# Patient Record
Sex: FEMALE | Race: BLACK OR AFRICAN AMERICAN | URBAN - METROPOLITAN AREA
[De-identification: names, ages, dates, MRNs, and addresses within clinical notes are randomized per-mention and may not be internally consistent; named-entity substitution may affect disease eponyms.]

---

## 2022-02-21 ENCOUNTER — WEB ENCOUNTER (OUTPATIENT)
Dept: URBAN - METROPOLITAN AREA CLINIC 13 | Facility: CLINIC | Age: 47
End: 2022-02-21

## 2022-03-02 ENCOUNTER — OFFICE VISIT (OUTPATIENT)
Dept: URBAN - METROPOLITAN AREA CLINIC 105 | Facility: CLINIC | Age: 47
End: 2022-03-02
Payer: COMMERCIAL

## 2022-03-02 ENCOUNTER — WEB ENCOUNTER (OUTPATIENT)
Dept: URBAN - METROPOLITAN AREA CLINIC 105 | Facility: CLINIC | Age: 47
End: 2022-03-02

## 2022-03-02 ENCOUNTER — LAB OUTSIDE AN ENCOUNTER (OUTPATIENT)
Dept: URBAN - METROPOLITAN AREA CLINIC 105 | Facility: CLINIC | Age: 47
End: 2022-03-02

## 2022-03-02 VITALS
DIASTOLIC BLOOD PRESSURE: 88 MMHG | BODY MASS INDEX: 42.34 KG/M2 | HEART RATE: 113 BPM | SYSTOLIC BLOOD PRESSURE: 135 MMHG | TEMPERATURE: 97.2 F | HEIGHT: 64 IN | WEIGHT: 248 LBS

## 2022-03-02 DIAGNOSIS — D50.9 IRON DEFICIENCY ANEMIA, UNSPECIFIED IRON DEFICIENCY ANEMIA TYPE: ICD-10-CM

## 2022-03-02 PROCEDURE — 99204 OFFICE O/P NEW MOD 45 MIN: CPT | Performed by: INTERNAL MEDICINE

## 2022-03-02 RX ORDER — KRILL/OM-3/DHA/EPA/PHOSPHO/AST 1000-230MG
1 TABLET CAPSULE ORAL ONCE A DAY
Status: ACTIVE | COMMUNITY

## 2022-03-02 RX ORDER — METFORMIN HYDROCHLORIDE 500 MG/1
1 TABLET WITH A MEAL TABLET, FILM COATED ORAL ONCE A DAY
Status: ACTIVE | COMMUNITY

## 2022-03-02 RX ORDER — LISINOPRIL 10 MG/1
1 TABLET TABLET ORAL ONCE A DAY
Status: ACTIVE | COMMUNITY

## 2022-03-02 RX ORDER — LOSARTAN POTASSIUM 25 MG/1
1 TABLET TABLET ORAL ONCE A DAY
Status: ACTIVE | COMMUNITY

## 2022-03-02 RX ORDER — SEMAGLUTIDE 1.34 MG/ML
AS DIRECTED INJECTION, SOLUTION SUBCUTANEOUS
Status: ACTIVE | COMMUNITY

## 2022-03-02 RX ORDER — SODIUM, POTASSIUM,MAG SULFATES 17.5-3.13G
AS DIRECTED SOLUTION, RECONSTITUTED, ORAL ORAL
Status: ACTIVE | COMMUNITY

## 2022-03-02 RX ORDER — ATORVASTATIN CALCIUM 20 MG/1
1 TABLET TABLET, FILM COATED ORAL ONCE A DAY
Status: ACTIVE | COMMUNITY

## 2022-03-02 RX ORDER — LEVOTHYROXINE SODIUM 50 UG/1
1 TABLET IN THE MORNING ON AN EMPTY STOMACH TABLET ORAL ONCE A DAY
Status: ACTIVE | COMMUNITY

## 2022-03-02 NOTE — HPI-TODAY'S VISIT:
The patient presents for iron deficiency anemia.  Today, the patient says her prior colon was about 20 years ago. She denies taking ibuprofen or Advil, having melena, having rectal bleeding, or experiencing heartburn/regurgitation. She says she took iron 3 weeks ago for only 2 days because she felt a side-effect of constipation. No other reported complaints today. She says she previously had a direct access colon scheduled with a different physician following her lab result.  Labs 12/30/21 - A1c 7. CMP, TSH/FT4 all normal.

## 2022-04-01 ENCOUNTER — WEB ENCOUNTER (OUTPATIENT)
Dept: URBAN - METROPOLITAN AREA SURGERY CENTER 16 | Facility: SURGERY CENTER | Age: 47
End: 2022-04-01

## 2022-04-07 ENCOUNTER — OFFICE VISIT (OUTPATIENT)
Dept: URBAN - METROPOLITAN AREA SURGERY CENTER 16 | Facility: SURGERY CENTER | Age: 47
End: 2022-04-07
Payer: COMMERCIAL

## 2022-04-07 ENCOUNTER — TELEPHONE ENCOUNTER (OUTPATIENT)
Dept: URBAN - METROPOLITAN AREA CLINIC 92 | Facility: CLINIC | Age: 47
End: 2022-04-07

## 2022-04-07 DIAGNOSIS — K29.80 ACUTE DUODENITIS: ICD-10-CM

## 2022-04-07 DIAGNOSIS — K29.60 ADENOPAPILLOMATOSIS GASTRICA: ICD-10-CM

## 2022-04-07 DIAGNOSIS — D50.9 ANEMIA: ICD-10-CM

## 2022-04-07 DIAGNOSIS — K22.70 BARRETT ESOPHAGUS: ICD-10-CM

## 2022-04-07 PROCEDURE — 43239 EGD BIOPSY SINGLE/MULTIPLE: CPT | Performed by: INTERNAL MEDICINE

## 2022-04-07 PROCEDURE — 45378 DIAGNOSTIC COLONOSCOPY: CPT | Performed by: INTERNAL MEDICINE

## 2022-04-07 PROCEDURE — G8907 PT DOC NO EVENTS ON DISCHARG: HCPCS | Performed by: INTERNAL MEDICINE

## 2022-04-07 RX ORDER — ATORVASTATIN CALCIUM 20 MG/1
1 TABLET TABLET, FILM COATED ORAL ONCE A DAY
Status: ACTIVE | COMMUNITY

## 2022-04-07 RX ORDER — KRILL/OM-3/DHA/EPA/PHOSPHO/AST 1000-230MG
1 TABLET CAPSULE ORAL ONCE A DAY
Status: ACTIVE | COMMUNITY

## 2022-04-07 RX ORDER — SEMAGLUTIDE 1.34 MG/ML
AS DIRECTED INJECTION, SOLUTION SUBCUTANEOUS
Status: ACTIVE | COMMUNITY

## 2022-04-07 RX ORDER — LEVOTHYROXINE SODIUM 50 UG/1
1 TABLET IN THE MORNING ON AN EMPTY STOMACH TABLET ORAL ONCE A DAY
Status: ACTIVE | COMMUNITY

## 2022-04-07 RX ORDER — METFORMIN HYDROCHLORIDE 500 MG/1
1 TABLET WITH A MEAL TABLET, FILM COATED ORAL ONCE A DAY
Status: ACTIVE | COMMUNITY

## 2022-04-07 RX ORDER — OMEPRAZOLE 40 MG/1
1 CAPSULE 30 MINUTES BEFORE MORNING MEAL CAPSULE, DELAYED RELEASE ORAL ONCE A DAY
Qty: 30 | Refills: 2 | OUTPATIENT
Start: 2022-04-07

## 2022-04-07 RX ORDER — LISINOPRIL 10 MG/1
1 TABLET TABLET ORAL ONCE A DAY
Status: ACTIVE | COMMUNITY

## 2022-04-07 RX ORDER — SODIUM, POTASSIUM,MAG SULFATES 17.5-3.13G
AS DIRECTED SOLUTION, RECONSTITUTED, ORAL ORAL
Status: ACTIVE | COMMUNITY

## 2022-04-07 RX ORDER — LOSARTAN POTASSIUM 25 MG/1
1 TABLET TABLET ORAL ONCE A DAY
Status: ACTIVE | COMMUNITY

## 2022-05-02 ENCOUNTER — TELEPHONE ENCOUNTER (OUTPATIENT)
Dept: URBAN - METROPOLITAN AREA CLINIC 92 | Facility: CLINIC | Age: 47
End: 2022-05-02

## 2022-05-02 RX ORDER — OMEPRAZOLE 40 MG/1
1 CAPSULE 30 MINUTES BEFORE MORNING MEAL CAPSULE, DELAYED RELEASE ORAL ONCE A DAY
Qty: 30 | Refills: 2 | OUTPATIENT
Start: 2022-04-07

## 2022-05-06 ENCOUNTER — OFFICE VISIT (OUTPATIENT)
Dept: URBAN - METROPOLITAN AREA TELEHEALTH 2 | Facility: TELEHEALTH | Age: 47
End: 2022-05-06
Payer: COMMERCIAL

## 2022-05-06 DIAGNOSIS — Z12.11 SCREEN FOR COLON CANCER: ICD-10-CM

## 2022-05-06 DIAGNOSIS — D50.9 IRON DEFICIENCY ANEMIA, UNSPECIFIED IRON DEFICIENCY ANEMIA TYPE: ICD-10-CM

## 2022-05-06 DIAGNOSIS — K25.3 ACUTE EROSION OF PYLORIC ANTRUM: ICD-10-CM

## 2022-05-06 DIAGNOSIS — K22.70 BARRETT'S ESOPHAGUS WITHOUT DYSPLASIA: ICD-10-CM

## 2022-05-06 PROCEDURE — 99213 OFFICE O/P EST LOW 20 MIN: CPT | Performed by: INTERNAL MEDICINE

## 2022-05-06 RX ORDER — OMEPRAZOLE 20 MG/1
1 CAPSULE 30 MINUTES BEFORE MORNING MEAL CAPSULE, DELAYED RELEASE ORAL ONCE A DAY
Qty: 90 | Refills: 1 | OUTPATIENT
Start: 2022-04-07

## 2022-05-06 RX ORDER — OMEPRAZOLE 40 MG/1
1 CAPSULE 30 MINUTES BEFORE MORNING MEAL CAPSULE, DELAYED RELEASE ORAL ONCE A DAY
Qty: 30 | Refills: 2 | Status: ACTIVE | COMMUNITY
Start: 2022-04-07

## 2022-05-06 RX ORDER — KRILL/OM-3/DHA/EPA/PHOSPHO/AST 1000-230MG
1 TABLET CAPSULE ORAL ONCE A DAY
Status: ACTIVE | COMMUNITY

## 2022-05-06 RX ORDER — ATORVASTATIN CALCIUM 20 MG/1
1 TABLET TABLET, FILM COATED ORAL ONCE A DAY
Status: ACTIVE | COMMUNITY

## 2022-05-06 RX ORDER — LEVOTHYROXINE SODIUM 50 UG/1
1 TABLET IN THE MORNING ON AN EMPTY STOMACH TABLET ORAL ONCE A DAY
Status: ACTIVE | COMMUNITY

## 2022-05-06 RX ORDER — SEMAGLUTIDE 1.34 MG/ML
AS DIRECTED INJECTION, SOLUTION SUBCUTANEOUS
Status: ACTIVE | COMMUNITY

## 2022-05-06 RX ORDER — LOSARTAN POTASSIUM 25 MG/1
1 TABLET TABLET ORAL ONCE A DAY
Status: ACTIVE | COMMUNITY

## 2022-05-06 NOTE — HPI-TODAY'S VISIT:
The patient presents on follow-up for iron deficiency anemia.  On 3/2/22, the patient said her prior colon was about 20 years ago. She denied taking ibuprofen or Advil, having melena, having rectal bleeding, or experiencing heartburn/regurgitation. She said she took iron 3 weeks ago for only 2 days because she felt a side-effect of constipation. No other reported complaints today. She said she previously had a direct access colon scheduled with a different physician following her lab result.  EGD/Colonoscopy performed - results below.  Today, she says she has started on omeprazole 40 mg QD - asymptomatic today. Regurgitation was her primary reflux symptom, but that has resolved since starting on omeprazole. She previously had an ongoing cough that Dr. Wise attributed to lisinopril use - switched to losartan.   She says she was recently diagnosed with breast cancer - will have a lumpectomy and reconstructive surgery in a week then radiation for a few weeks.  Labs 2/3/22 - CBC normal except MCV 78.8, platelets 474. HDL 44. TSH normal. 12/30/21 - A1c 7. CMP, TSH/FT4 all normal.

## 2022-08-05 ENCOUNTER — OFFICE VISIT (OUTPATIENT)
Dept: URBAN - METROPOLITAN AREA TELEHEALTH 2 | Facility: TELEHEALTH | Age: 47
End: 2022-08-05
Payer: COMMERCIAL

## 2022-08-05 DIAGNOSIS — Z12.11 SCREEN FOR COLON CANCER: ICD-10-CM

## 2022-08-05 DIAGNOSIS — D50.9 IRON DEFICIENCY ANEMIA, UNSPECIFIED IRON DEFICIENCY ANEMIA TYPE: ICD-10-CM

## 2022-08-05 DIAGNOSIS — K25.3 ACUTE EROSION OF PYLORIC ANTRUM: ICD-10-CM

## 2022-08-05 DIAGNOSIS — K22.70 BARRETT'S ESOPHAGUS WITHOUT DYSPLASIA: ICD-10-CM

## 2022-08-05 PROCEDURE — 99213 OFFICE O/P EST LOW 20 MIN: CPT | Performed by: INTERNAL MEDICINE

## 2022-08-05 RX ORDER — SEMAGLUTIDE 1.34 MG/ML
AS DIRECTED INJECTION, SOLUTION SUBCUTANEOUS
Status: ACTIVE | COMMUNITY

## 2022-08-05 RX ORDER — LOSARTAN POTASSIUM 25 MG/1
1 TABLET TABLET ORAL ONCE A DAY
Status: ACTIVE | COMMUNITY

## 2022-08-05 RX ORDER — ATORVASTATIN CALCIUM 20 MG/1
1 TABLET TABLET, FILM COATED ORAL ONCE A DAY
Status: ACTIVE | COMMUNITY

## 2022-08-05 RX ORDER — LEVOTHYROXINE SODIUM 50 UG/1
1 TABLET IN THE MORNING ON AN EMPTY STOMACH TABLET ORAL ONCE A DAY
Status: ACTIVE | COMMUNITY

## 2022-08-05 RX ORDER — KRILL/OM-3/DHA/EPA/PHOSPHO/AST 1000-230MG
1 TABLET CAPSULE ORAL ONCE A DAY
Status: ACTIVE | COMMUNITY

## 2022-08-05 RX ORDER — OMEPRAZOLE 20 MG/1
1 CAPSULE 30 MINUTES BEFORE MORNING MEAL CAPSULE, DELAYED RELEASE ORAL ONCE A DAY
Qty: 90 | Refills: 1 | Status: ACTIVE | COMMUNITY
Start: 2022-04-07

## 2022-08-05 NOTE — HPI-TODAY'S VISIT:
The patient presents on follow-up for iron deficiency anemia.  On 3/2/22, the patient said her prior colon was about 20 years ago. She denied taking ibuprofen or Advil, having melena, having rectal bleeding, or experiencing heartburn/regurgitation. She said she took iron 3 weeks ago for only 2 days because she felt a side-effect of constipation. No other reported complaints today. She said she previously had a direct access colon scheduled with a different physician following her lab result.  EGD/Colonoscopy performed - results below.  On 5/6/22, she said she had started on omeprazole 40 mg QD - asymptomatic today. Regurgitation was her primary reflux symptom, but that had resolved since starting on omeprazole. She previously had an ongoing cough that Dr. Wise attributed to lisinopril use - switched to losartan.   She said she was recently diagnosed with breast cancer - would have a lumpectomy and reconstructive surgery in a week then radiation for a few weeks.  HPI: Today, she says she is doing better. She proceeded with a lumpectomy and breast reduction s/p radiation.  Since starting on omeprazole 20 mg QD, her post-prandial coughing has resolved. Her last appt with Dr. Wise was 1-2 weeks ago. She has not taken iron since going through radiation. For 2 days, she had taken slow Fe but d/c due to constipation. Dr. Wise has now rx'd Integra after recent labs noted low iron and is waiting for it to arrive. She will have bloodwork done in another month. She took Miralax at some point following her last visit. She had a hysterectomy years ago.  Labs 2/3/22 - CBC normal except MCV 78.8, platelets 474. HDL 44. TSH normal. 12/30/21 - A1c 7. CMP, TSH/FT4 all normal.

## 2022-09-29 ENCOUNTER — TELEPHONE ENCOUNTER (OUTPATIENT)
Dept: URBAN - METROPOLITAN AREA CLINIC 105 | Facility: CLINIC | Age: 47
End: 2022-09-29

## 2022-09-29 RX ORDER — OMEPRAZOLE 20 MG/1
1 CAPSULE 30 MINUTES BEFORE MORNING MEAL CAPSULE, DELAYED RELEASE ORAL ONCE A DAY
Qty: 90 | Refills: 3 | OUTPATIENT
Start: 2022-04-07

## 2023-02-10 ENCOUNTER — OFFICE VISIT (OUTPATIENT)
Dept: URBAN - METROPOLITAN AREA TELEHEALTH 2 | Facility: TELEHEALTH | Age: 48
End: 2023-02-10
Payer: COMMERCIAL

## 2023-02-10 VITALS — HEIGHT: 64 IN

## 2023-02-10 DIAGNOSIS — K25.3 ACUTE EROSION OF PYLORIC ANTRUM: ICD-10-CM

## 2023-02-10 DIAGNOSIS — D50.8 OTHER IRON DEFICIENCY ANEMIA: ICD-10-CM

## 2023-02-10 DIAGNOSIS — K22.70 BARRETT'S ESOPHAGUS WITHOUT DYSPLASIA: ICD-10-CM

## 2023-02-10 DIAGNOSIS — K59.09 CHRONIC CONSTIPATION: ICD-10-CM

## 2023-02-10 PROCEDURE — 99213 OFFICE O/P EST LOW 20 MIN: CPT | Performed by: INTERNAL MEDICINE

## 2023-02-10 RX ORDER — LOSARTAN POTASSIUM 25 MG/1
1 TABLET TABLET ORAL ONCE A DAY
COMMUNITY

## 2023-02-10 RX ORDER — OMEPRAZOLE 10 MG/1
1 CAPSULE 30 MINUTES BEFORE MORNING MEAL CAPSULE, DELAYED RELEASE ORAL ONCE A DAY
Qty: 90 | Refills: 3 | OUTPATIENT
Start: 2022-04-07

## 2023-02-10 RX ORDER — LEVOTHYROXINE SODIUM 50 UG/1
1 TABLET IN THE MORNING ON AN EMPTY STOMACH TABLET ORAL ONCE A DAY
COMMUNITY

## 2023-02-10 RX ORDER — KRILL/OM-3/DHA/EPA/PHOSPHO/AST 1000-230MG
1 TABLET CAPSULE ORAL ONCE A DAY
COMMUNITY

## 2023-02-10 RX ORDER — ATORVASTATIN CALCIUM 20 MG/1
1 TABLET TABLET, FILM COATED ORAL ONCE A DAY
COMMUNITY

## 2023-02-10 RX ORDER — SEMAGLUTIDE 1.34 MG/ML
AS DIRECTED INJECTION, SOLUTION SUBCUTANEOUS
COMMUNITY

## 2023-02-10 RX ORDER — OMEPRAZOLE 20 MG/1
1 CAPSULE 30 MINUTES BEFORE MORNING MEAL CAPSULE, DELAYED RELEASE ORAL ONCE A DAY
Qty: 90 | Refills: 3 | COMMUNITY
Start: 2022-04-07

## 2023-02-10 NOTE — HPI-TODAY'S VISIT:
The patient presents on follow-up for iron deficiency anemia.  On 3/2/22, the patient said her prior colon was about 20 years ago. She denied taking ibuprofen or Advil, having melena, having rectal bleeding, or experiencing heartburn/regurgitation. She said she took iron 3 weeks ago for only 2 days because she felt a side-effect of constipation. No other reported complaints today. She said she previously had a direct access colon scheduled with a different physician following her lab result.  EGD/Colonoscopy performed - results below.  On 5/6/22, she said she had started on omeprazole 40 mg QD - asymptomatic today. Regurgitation was her primary reflux symptom, but that had resolved since starting on omeprazole. She previously had an ongoing cough that Dr. Wise attributed to lisinopril use - switched to losartan.   She said she was recently diagnosed with breast cancer - would have a lumpectomy and reconstructive surgery in a week then radiation for a few weeks.  On 8/5/22, she said she was doing better. She proceeded with a lumpectomy and breast reduction s/p radiation.  Since starting on omeprazole 20 mg QD, her post-prandial coughing had resolved. Her last appt with Dr. Wise was 1-2 weeks ago. She had not taken iron since going through radiation. For 2 days, she had taken slow Fe but d/c due to constipation. Dr. Wise had now rx'd Integra after recent labs noted low iron and was waiting for it to arrive. She would have bloodwork done in another month. She took Miralax at some point following her last visit. She had a hysterectomy years ago.  HPI: Today, she says she has started taking Integra (no constipation). She is using Miralax 1 cap QD - has a BM 3-4x/week with good evac. Last labs were yesterday with Dr. Wise. No reflux on omeprazole 20 mg QD.  Labs 2/3/22 - CBC normal except MCV 78.8, platelets 474. HDL 44. TSH normal. 12/30/21 - A1c 7. CMP, TSH/FT4 all normal.

## 2023-02-12 PROBLEM — 161800001: Status: ACTIVE | Noted: 2022-08-11

## 2023-02-12 PROBLEM — 372064008: Status: ACTIVE | Noted: 2022-08-11

## 2023-02-17 PROBLEM — 87522002 IRON DEFICIENCY ANEMIA: Status: ACTIVE | Noted: 2023-02-17

## 2023-09-21 ENCOUNTER — WEB ENCOUNTER (OUTPATIENT)
Dept: URBAN - METROPOLITAN AREA CLINIC 105 | Facility: CLINIC | Age: 48
End: 2023-09-21

## 2023-09-27 ENCOUNTER — OFFICE VISIT (OUTPATIENT)
Dept: URBAN - METROPOLITAN AREA CLINIC 105 | Facility: CLINIC | Age: 48
End: 2023-09-27
Payer: COMMERCIAL

## 2023-09-27 ENCOUNTER — DASHBOARD ENCOUNTERS (OUTPATIENT)
Age: 48
End: 2023-09-27

## 2023-09-27 VITALS
BODY MASS INDEX: 41.15 KG/M2 | HEART RATE: 114 BPM | SYSTOLIC BLOOD PRESSURE: 134 MMHG | WEIGHT: 241 LBS | TEMPERATURE: 97.3 F | DIASTOLIC BLOOD PRESSURE: 84 MMHG | HEIGHT: 64 IN

## 2023-09-27 DIAGNOSIS — K22.70 BARRETT'S ESOPHAGUS WITHOUT DYSPLASIA: ICD-10-CM

## 2023-09-27 DIAGNOSIS — D50.9 IRON DEFICIENCY ANEMIA, UNSPECIFIED IRON DEFICIENCY ANEMIA TYPE: ICD-10-CM

## 2023-09-27 DIAGNOSIS — K59.01 CONSTIPATION: ICD-10-CM

## 2023-09-27 DIAGNOSIS — Z12.11 SCREEN FOR COLON CANCER: ICD-10-CM

## 2023-09-27 PROBLEM — 302914006: Status: ACTIVE | Noted: 2022-05-06

## 2023-09-27 PROBLEM — 14760008: Status: ACTIVE | Noted: 2022-08-11

## 2023-09-27 PROBLEM — 162864005: Status: ACTIVE | Noted: 2023-09-27

## 2023-09-27 PROBLEM — 87522002: Status: ACTIVE | Noted: 2022-03-02

## 2023-09-27 PROCEDURE — 99214 OFFICE O/P EST MOD 30 MIN: CPT | Performed by: INTERNAL MEDICINE

## 2023-09-27 RX ORDER — ATORVASTATIN CALCIUM 20 MG/1
1 TABLET TABLET, FILM COATED ORAL ONCE A DAY
Status: ACTIVE | COMMUNITY

## 2023-09-27 RX ORDER — LEVOTHYROXINE SODIUM 50 UG/1
1 TABLET IN THE MORNING ON AN EMPTY STOMACH TABLET ORAL ONCE A DAY
Status: ACTIVE | COMMUNITY

## 2023-09-27 RX ORDER — OMEPRAZOLE 10 MG/1
1 CAPSULE 30 MINUTES BEFORE MORNING MEAL CAPSULE, DELAYED RELEASE ORAL ONCE A DAY
Qty: 90 | Refills: 3 | OUTPATIENT
Start: 2022-04-07

## 2023-09-27 RX ORDER — OMEPRAZOLE 10 MG/1
1 CAPSULE 30 MINUTES BEFORE MORNING MEAL CAPSULE, DELAYED RELEASE ORAL ONCE A DAY
Qty: 90 | Refills: 3 | Status: ACTIVE | COMMUNITY
Start: 2022-04-07

## 2023-09-27 RX ORDER — LOSARTAN POTASSIUM 25 MG/1
1 TABLET TABLET ORAL ONCE A DAY
Status: ACTIVE | COMMUNITY

## 2023-09-27 RX ORDER — SEMAGLUTIDE 1.34 MG/ML
AS DIRECTED INJECTION, SOLUTION SUBCUTANEOUS
Status: ACTIVE | COMMUNITY

## 2023-09-27 RX ORDER — KRILL/OM-3/DHA/EPA/PHOSPHO/AST 1000-230MG
1 TABLET CAPSULE ORAL ONCE A DAY
Status: ACTIVE | COMMUNITY

## 2023-09-27 NOTE — HPI-TODAY'S VISIT:
The patient presents on follow-up for iron deficiency anemia.  On 3/2/22, the patient said her prior colon was about 20 years ago. She denied taking ibuprofen or Advil, having melena, having rectal bleeding, or experiencing heartburn/regurgitation. She said she took iron 3 weeks ago for only 2 days because she felt a side-effect of constipation. No other reported complaints today. She said she previously had a direct access colon scheduled with a different physician following her lab result.  EGD/Colonoscopy performed - results below.  On 5/6/22, she said she had started on omeprazole 40 mg QD - asymptomatic today. Regurgitation was her primary reflux symptom, but that had resolved since starting on omeprazole. She previously had an ongoing cough that Dr. Wise attributed to lisinopril use - switched to losartan.   She said she was recently diagnosed with breast cancer - would have a lumpectomy and reconstructive surgery in a week then radiation for a few weeks.  On 8/5/22, she said she was doing better. She proceeded with a lumpectomy and breast reduction s/p radiation.  Since starting on omeprazole 20 mg QD, her post-prandial coughing had resolved. Her last appt with Dr. Wise was 1-2 weeks ago. She had not taken iron since going through radiation. For 2 days, she had taken slow Fe but d/c due to constipation. Dr. Wise had now rx'd Integra after recent labs noted low iron and was waiting for it to arrive. She would have bloodwork done in another month. She took Miralax at some point following her last visit. She had a hysterectomy years ago.  On 2/10/23, she said she had started taking Integra (no constipation). She was using Miralax 1 cap QD - had a BM 3-4x/week with good evac. Last labs were yesterday with Dr. Wise. No reflux on omeprazole 20 mg QD.  HPI: Today, she says Miralax 1 cap QD works well. She has a BM 3-4x/week. She is doing well on the decreased dose of omeprazole, 10 mg QD. She is off iron. She has seen Dr. Wise in the past 1-2 months - A1c increased.  Labs 2/3/22 - CBC normal except MCV 78.8, platelets 474. HDL 44. TSH normal. 12/30/21 - A1c 7. CMP, TSH/FT4 all normal.

## 2024-05-26 ENCOUNTER — WEB ENCOUNTER (OUTPATIENT)
Dept: URBAN - METROPOLITAN AREA CLINIC 105 | Facility: CLINIC | Age: 49
End: 2024-05-26

## 2024-06-05 ENCOUNTER — OFFICE VISIT (OUTPATIENT)
Dept: URBAN - METROPOLITAN AREA CLINIC 105 | Facility: CLINIC | Age: 49
End: 2024-06-05
Payer: COMMERCIAL

## 2024-06-05 ENCOUNTER — OFFICE VISIT (OUTPATIENT)
Dept: URBAN - METROPOLITAN AREA CLINIC 105 | Facility: CLINIC | Age: 49
End: 2024-06-05

## 2024-06-05 VITALS
SYSTOLIC BLOOD PRESSURE: 151 MMHG | WEIGHT: 243 LBS | DIASTOLIC BLOOD PRESSURE: 82 MMHG | TEMPERATURE: 97.5 F | HEIGHT: 64 IN | BODY MASS INDEX: 41.48 KG/M2 | HEART RATE: 114 BPM

## 2024-06-05 DIAGNOSIS — F43.9 STRESS: ICD-10-CM

## 2024-06-05 DIAGNOSIS — Z12.11 SCREEN FOR COLON CANCER: ICD-10-CM

## 2024-06-05 DIAGNOSIS — D50.9 IRON DEFICIENCY ANEMIA, UNSPECIFIED IRON DEFICIENCY ANEMIA TYPE: ICD-10-CM

## 2024-06-05 DIAGNOSIS — K25.3 ACUTE EROSION OF PYLORIC ANTRUM: ICD-10-CM

## 2024-06-05 DIAGNOSIS — K59.00 CONSTIPATION, UNSPECIFIED CONSTIPATION TYPE: ICD-10-CM

## 2024-06-05 DIAGNOSIS — C50.919 MALIGNANT NEOPLASM OF FEMALE BREAST, UNSPECIFIED ESTROGEN RECEPTOR STATUS, UNSPECIFIED LATERALITY, UNSPECIFIED SITE OF BREAST: ICD-10-CM

## 2024-06-05 DIAGNOSIS — K22.70 BARRETT'S ESOPHAGUS WITHOUT DYSPLASIA: ICD-10-CM

## 2024-06-05 DIAGNOSIS — Z90.710 HISTORY OF HYSTERECTOMY: ICD-10-CM

## 2024-06-05 DIAGNOSIS — E66.9 OBESITY (BMI 30-39.9): ICD-10-CM

## 2024-06-05 PROBLEM — 73595000: Status: ACTIVE | Noted: 2024-06-05

## 2024-06-05 PROCEDURE — 99213 OFFICE O/P EST LOW 20 MIN: CPT | Performed by: INTERNAL MEDICINE

## 2024-06-05 RX ORDER — OMEPRAZOLE 10 MG/1
1 CAPSULE 30 MINUTES BEFORE MORNING MEAL CAPSULE, DELAYED RELEASE ORAL ONCE A DAY
Qty: 90 | Refills: 3 | COMMUNITY
Start: 2022-04-07

## 2024-06-05 RX ORDER — ATORVASTATIN CALCIUM 20 MG/1
1 TABLET TABLET, FILM COATED ORAL ONCE A DAY
COMMUNITY

## 2024-06-05 RX ORDER — SEMAGLUTIDE 1.34 MG/ML
AS DIRECTED INJECTION, SOLUTION SUBCUTANEOUS
COMMUNITY

## 2024-06-05 RX ORDER — LEVOTHYROXINE SODIUM 50 UG/1
1 TABLET IN THE MORNING ON AN EMPTY STOMACH TABLET ORAL ONCE A DAY
Status: ACTIVE | COMMUNITY

## 2024-06-05 RX ORDER — LOSARTAN POTASSIUM 25 MG/1
1 TABLET TABLET ORAL ONCE A DAY
Status: ACTIVE | COMMUNITY

## 2024-06-05 RX ORDER — ATORVASTATIN CALCIUM 20 MG/1
1 TABLET TABLET, FILM COATED ORAL ONCE A DAY
Status: ACTIVE | COMMUNITY

## 2024-06-05 RX ORDER — KRILL/OM-3/DHA/EPA/PHOSPHO/AST 1000-230MG
1 TABLET CAPSULE ORAL ONCE A DAY
Status: ACTIVE | COMMUNITY

## 2024-06-05 RX ORDER — LOSARTAN POTASSIUM 25 MG/1
1 TABLET TABLET ORAL ONCE A DAY
COMMUNITY

## 2024-06-05 RX ORDER — KRILL/OM-3/DHA/EPA/PHOSPHO/AST 1000-230MG
1 TABLET CAPSULE ORAL ONCE A DAY
COMMUNITY

## 2024-06-05 RX ORDER — SEMAGLUTIDE 1.34 MG/ML
AS DIRECTED INJECTION, SOLUTION SUBCUTANEOUS
Status: ACTIVE | COMMUNITY

## 2024-06-05 RX ORDER — LEVOTHYROXINE SODIUM 50 UG/1
1 TABLET IN THE MORNING ON AN EMPTY STOMACH TABLET ORAL ONCE A DAY
COMMUNITY

## 2024-06-05 RX ORDER — OMEPRAZOLE 10 MG/1
1 CAPSULE 30 MINUTES BEFORE MORNING MEAL CAPSULE, DELAYED RELEASE ORAL ONCE A DAY
Qty: 90 | Refills: 3 | Status: ACTIVE | COMMUNITY
Start: 2022-04-07

## 2024-06-05 NOTE — HPI-TODAY'S VISIT:
The patient presents on follow-up for iron deficiency anemia.  On 3/2/22, the patient said her prior colon was about 20 years ago. She denied taking ibuprofen or Advil, having melena, having rectal bleeding, or experiencing heartburn/regurgitation. She said she took iron 3 weeks ago for only 2 days because she felt a side-effect of constipation. No other reported complaints today. She said she previously had a direct access colon scheduled with a different physician following her lab result.  EGD/Colonoscopy performed - results below.  On 5/6/22, she said she had started on omeprazole 40 mg QD - asymptomatic today. Regurgitation was her primary reflux symptom, but that had resolved since starting on omeprazole. She previously had an ongoing cough that Dr. Wise attributed to lisinopril use - switched to losartan.   She said she was recently diagnosed with breast cancer - would have a lumpectomy and reconstructive surgery in a week then radiation for a few weeks.  On 8/5/22, she said she was doing better. She proceeded with a lumpectomy and breast reduction s/p radiation.  Since starting on omeprazole 20 mg QD, her post-prandial coughing had resolved. Her last appt with Dr. Wise was 1-2 weeks ago. She had not taken iron since going through radiation. For 2 days, she had taken slow Fe but d/c due to constipation. Dr. Wise had now rx'd Integra after recent labs noted low iron and was waiting for it to arrive. She would have bloodwork done in another month. She took Miralax at some point following her last visit. She had a hysterectomy years ago.  On 2/10/23, she said she had started taking Integra (no constipation). She was using Miralax 1 cap QD - had a BM 3-4x/week with good evac. Last labs were yesterday with Dr. Wise. No reflux on omeprazole 20 mg QD.  On 9/27/23, she said Miralax 1 cap QD worked well. She had a BM 3-4x/week. She was doing well on the decreased dose of omeprazole, 10 mg QD. She was off iron. She had seen Dr. Wise in the past 1-2 months - A1c increased.  HPI: Today, she says she is doing well on omeprazole 10 mg QD, no reflux symptoms. She continues to use Miralax daily. New PCP has drawn iron labs which were normal. 4-5 weeks ago, she started having abdominal pain with a change in bowel habit. She thinks there is a stress component to her symptoms, but this bout is more unique due to the persistent change in bowel habit. Currently, her pain is less and her stools are more firm, but not back to normal consistency. She is worried that she has ulcers. 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 She started on Trelegy a few months ago for asthma.  Labs 2/3/22 - CBC normal except MCV 78.8, platelets 474. HDL 44. TSH normal. 12/30/21 - A1c 7. CMP, TSH/FT4 all normal.

## 2024-09-04 ENCOUNTER — OFFICE VISIT (OUTPATIENT)
Dept: URBAN - METROPOLITAN AREA CLINIC 105 | Facility: CLINIC | Age: 49
End: 2024-09-04
Payer: COMMERCIAL

## 2024-09-04 VITALS
HEIGHT: 64 IN | DIASTOLIC BLOOD PRESSURE: 88 MMHG | WEIGHT: 247.2 LBS | SYSTOLIC BLOOD PRESSURE: 134 MMHG | HEART RATE: 105 BPM | BODY MASS INDEX: 42.2 KG/M2 | TEMPERATURE: 97.3 F

## 2024-09-04 DIAGNOSIS — K59.09 OTHER CONSTIPATION: ICD-10-CM

## 2024-09-04 PROCEDURE — 99213 OFFICE O/P EST LOW 20 MIN: CPT | Performed by: INTERNAL MEDICINE

## 2024-09-04 RX ORDER — OMEPRAZOLE 10 MG/1
1 CAPSULE 30 MINUTES BEFORE MORNING MEAL CAPSULE, DELAYED RELEASE ORAL ONCE A DAY
Qty: 90 | Refills: 3 | Status: ACTIVE | COMMUNITY
Start: 2022-04-07

## 2024-09-04 RX ORDER — SEMAGLUTIDE 1.34 MG/ML
AS DIRECTED INJECTION, SOLUTION SUBCUTANEOUS
Status: ACTIVE | COMMUNITY

## 2024-09-04 RX ORDER — LOSARTAN POTASSIUM 25 MG/1
1 TABLET TABLET ORAL ONCE A DAY
Status: ACTIVE | COMMUNITY

## 2024-09-04 RX ORDER — KRILL/OM-3/DHA/EPA/PHOSPHO/AST 1000-230MG
1 TABLET CAPSULE ORAL ONCE A DAY
Status: ACTIVE | COMMUNITY

## 2024-09-04 RX ORDER — ATORVASTATIN CALCIUM 20 MG/1
1 TABLET TABLET, FILM COATED ORAL ONCE A DAY
Status: ACTIVE | COMMUNITY

## 2024-09-04 RX ORDER — BUPROPION HYDROCHLORIDE 150 MG/1
1 TABLET IN THE MORNING TABLET, EXTENDED RELEASE ORAL ONCE A DAY
Status: ACTIVE | COMMUNITY

## 2024-09-04 RX ORDER — LEVOTHYROXINE SODIUM 50 UG/1
1 TABLET IN THE MORNING ON AN EMPTY STOMACH TABLET ORAL ONCE A DAY
Status: ACTIVE | COMMUNITY

## 2024-09-04 NOTE — HPI-TODAY'S VISIT:
The patient presents on follow-up for iron deficiency anemia.  On 3/2/22, the patient said her prior colon was about 20 years ago. She denied taking ibuprofen or Advil, having melena, having rectal bleeding, or experiencing heartburn/regurgitation. She said she took iron 3 weeks ago for only 2 days because she felt a side-effect of constipation. No other reported complaints today. She said she previously had a direct access colon scheduled with a different physician following her lab result.  EGD/Colonoscopy performed - results below.  On 5/6/22, she said she had started on omeprazole 40 mg QD - asymptomatic today. Regurgitation was her primary reflux symptom, but that had resolved since starting on omeprazole. She previously had an ongoing cough that Dr. Wise attributed to lisinopril use - switched to losartan.   She said she was recently diagnosed with breast cancer - would have a lumpectomy and reconstructive surgery in a week then radiation for a few weeks.  On 8/5/22, she said she was doing better. She proceeded with a lumpectomy and breast reduction s/p radiation.  Since starting on omeprazole 20 mg QD, her post-prandial coughing had resolved. Her last appt with Dr. Wise was 1-2 weeks ago. She had not taken iron since going through radiation. For 2 days, she had taken slow Fe but d/c due to constipation. Dr. Wise had now rx'd Integra after recent labs noted low iron and was waiting for it to arrive. She would have bloodwork done in another month. She took Miralax at some point following her last visit. She had a hysterectomy years ago.  On 2/10/23, she said she had started taking Integra (no constipation). She was using Miralax 1 cap QD - had a BM 3-4x/week with good evac. Last labs were yesterday with Dr. Wise. No reflux on omeprazole 20 mg QD.  On 9/27/23, she said Miralax 1 cap QD worked well. She had a BM 3-4x/week. She was doing well on the decreased dose of omeprazole, 10 mg QD. She was off iron. She had seen Dr. Wise in the past 1-2 months - A1c increased.  On 6/5/24, she said she was doing well on omeprazole 10 mg QD, no reflux symptoms. She continued to use Miralax daily. New PCP had drawn iron labs which were normal. 4-5 weeks ago, she started having abdominal pain with a change in bowel habit. She thought there was a stress component to her symptoms, but this bout was more unique due to the persistent change in bowel habit. Currently, her pain was less and her stools were more firm, but not back to normal consistency. She was worried that she had ulcers. 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 She planned to change jobs. She started on Trelegy a few months ago for asthma.  Today, she is doing well.  She notes she quit her high stress job and is looking for a new position.  She began Wellbutrin 3 months ago along with CPAP for sleep apnea.  She is sleeping much better and feels rested.  She continues on omeprazole 10 mg daily.  She is taking Miralax 17 g daily and notes good evacuation, but not formed.  Labs 2/3/22 - CBC normal except MCV 78.8, platelets 474. HDL 44. TSH normal. 12/30/21 - A1c 7. CMP, TSH/FT4 all normal.

## 2025-01-05 ENCOUNTER — WEB ENCOUNTER (OUTPATIENT)
Dept: URBAN - METROPOLITAN AREA CLINIC 105 | Facility: CLINIC | Age: 50
End: 2025-01-05

## 2025-01-07 ENCOUNTER — WEB ENCOUNTER (OUTPATIENT)
Dept: URBAN - METROPOLITAN AREA CLINIC 105 | Facility: CLINIC | Age: 50
End: 2025-01-07

## 2025-03-11 ENCOUNTER — OFFICE VISIT (OUTPATIENT)
Dept: URBAN - METROPOLITAN AREA CLINIC 105 | Facility: CLINIC | Age: 50
End: 2025-03-11

## 2025-03-12 ENCOUNTER — OFFICE VISIT (OUTPATIENT)
Dept: URBAN - METROPOLITAN AREA CLINIC 105 | Facility: CLINIC | Age: 50
End: 2025-03-12